# Patient Record
Sex: FEMALE | Race: WHITE | NOT HISPANIC OR LATINO | Employment: FULL TIME | URBAN - METROPOLITAN AREA
[De-identification: names, ages, dates, MRNs, and addresses within clinical notes are randomized per-mention and may not be internally consistent; named-entity substitution may affect disease eponyms.]

---

## 2017-07-25 ENCOUNTER — TREATMENT (OUTPATIENT)
Dept: PHYSICAL THERAPY | Facility: CLINIC | Age: 32
End: 2017-07-25

## 2017-07-25 DIAGNOSIS — Z02.1 DRUG SCREENING, PRE-EMPLOYMENT: Primary | ICD-10-CM

## 2017-07-25 PROCEDURE — PDS: Performed by: PHYSICAL THERAPIST

## 2018-05-23 ENCOUNTER — OFFICE VISIT CONVERTED (OUTPATIENT)
Dept: FAMILY MEDICINE CLINIC | Age: 33
End: 2018-05-23
Attending: NURSE PRACTITIONER

## 2019-02-27 ENCOUNTER — HOSPITAL ENCOUNTER (OUTPATIENT)
Dept: OTHER | Facility: HOSPITAL | Age: 34
Discharge: HOME | End: 2019-02-27
Attending: NURSE PRACTITIONER

## 2019-02-27 ENCOUNTER — OFFICE VISIT CONVERTED (OUTPATIENT)
Dept: FAMILY MEDICINE CLINIC | Age: 34
End: 2019-02-27
Attending: NURSE PRACTITIONER

## 2019-02-27 LAB
ALBUMIN SERPL-MCNC: 4.1 G/DL (ref 3.5–5)
ALBUMIN/GLOB SERPL: 1.2 {RATIO} (ref 1.4–2.6)
ALP SERPL-CCNC: 92 U/L (ref 42–98)
ALT SERPL-CCNC: 17 U/L (ref 10–40)
ANION GAP SERPL CALC-SCNC: 15 MMOL/L (ref 8–19)
AST SERPL-CCNC: 21 U/L (ref 15–50)
BILIRUB SERPL-MCNC: 0.3 MG/DL (ref 0.2–1.3)
BUN SERPL-MCNC: 7 MG/DL (ref 5–25)
BUN/CREAT SERPL: 10 {RATIO} (ref 6–20)
CALCIUM SERPL-MCNC: 9.1 MG/DL (ref 8.7–10.4)
CHLORIDE SERPL-SCNC: 103 MMOL/L (ref 99–111)
CHOLEST SERPL-MCNC: 191 MG/DL (ref 107–200)
CHOLEST/HDLC SERPL: 4 {RATIO} (ref 3–6)
CONV CO2: 25 MMOL/L (ref 22–32)
CONV TOTAL PROTEIN: 7.6 G/DL (ref 6.3–8.2)
CREAT UR-MCNC: 0.69 MG/DL (ref 0.5–0.9)
GFR SERPLBLD BASED ON 1.73 SQ M-ARVRAT: >60 ML/MIN/{1.73_M2}
GLOBULIN UR ELPH-MCNC: 3.5 G/DL (ref 2–3.5)
GLUCOSE SERPL-MCNC: 82 MG/DL (ref 65–99)
HDLC SERPL-MCNC: 48 MG/DL (ref 40–60)
LDLC SERPL CALC-MCNC: 126 MG/DL (ref 70–100)
OSMOLALITY SERPL CALC.SUM OF ELEC: 283 MOSM/KG (ref 273–304)
POTASSIUM SERPL-SCNC: 4.6 MMOL/L (ref 3.5–5.3)
SODIUM SERPL-SCNC: 138 MMOL/L (ref 135–147)
TRIGL SERPL-MCNC: 86 MG/DL (ref 40–150)
TSH SERPL-ACNC: 1.7 M[IU]/L (ref 0.27–4.2)
VLDLC SERPL-MCNC: 17 MG/DL (ref 5–37)

## 2020-02-26 ENCOUNTER — OFFICE VISIT CONVERTED (OUTPATIENT)
Dept: FAMILY MEDICINE CLINIC | Age: 35
End: 2020-02-26
Attending: NURSE PRACTITIONER

## 2021-05-18 NOTE — PROGRESS NOTES
Keeley Duong 1985     Office/Outpatient Visit    Visit Date: Wed, Feb 27, 2019 10:41 am    Provider: Hailee Wright N.P. (Assistant: Kiki Khan LPN)    Location: [facility]        Electronically signed by Hailee Wright N.P. on  02/27/2019 08:22:01 PM                             SUBJECTIVE:        CC:     Valerie is a 34 year old White female.  Preventative exam;         HPI:         Patient to be evaluated for health checkup.  She cannot recall when she last had a physical exam.  Her last menstrual period was last week.  She is not currently using any form of contraception.  She performs breast self-exams monthly.    She has never had a Pap smear. She's had vision screening done 10 years ago and this was normal.   Preventative Health updated today.  She is not current with her Td and influenza immunization.  Tobacco: Current Smoker: Currently smokes cigarettes every day.          PHQ-9 Depression Screening: Completed form scanned and in chart; Total Score 5 Alcohol Consumption Screening: Completed form scanned and in chart; Total Score 0     ROS:     CONSTITUTIONAL:  Positive for fatigue ( mild ).   Negative for chills or fever.      EYES:  Positive for decreased vision left eye chronic.      E/N/T:  Negative for hearing problems, E/N/T pain, congestion, rhinorrhea, epistaxis, hoarseness, and dental problems.      CARDIOVASCULAR:  Negative for chest pain, palpitations, tachycardia, orthopnea, and edema.      RESPIRATORY:  Negative for cough, dyspnea, and hemoptysis.      GASTROINTESTINAL:  Negative for abdominal pain, heartburn, constipation, diarrhea, and stool changes.      GENITOURINARY:  Negative for genital lesions, hematuria, menstrual problems, polyuria, abnormal vaginal bleeding, and vaginal discharge.      MUSCULOSKELETAL:  Negative for arthralgias, back pain, and myalgias.      INTEGUMENTARY/BREAST:  Negative for rash.      NEUROLOGICAL:  Negative for dizziness, headaches,  paresthesias, and weakness.      ENDOCRINE:  Positive for hair loss.      ALLERGIC/IMMUNOLOGIC:  Negative for allergies, frequent illnesses, HIV exposure, and urticaria.      PSYCHIATRIC:  Positive for insomnia; related to third shigt.          PMH/FMH/SH:     Last Reviewed on 2019 11:14 AM by Hailee Wright    Past Medical History:         Fracture(s): left wrist and right small finger;     has been told she has overactive thyroid, has not been treated         GYNECOLOGICAL HISTORY:    G0    Not currently using any form of contraception.          Surgical History:         ganglion cyst removal left wrist ;         Family History:     Father: Hypertension; sleep apnea;  Hypothyroidism     Mother: sleep apnea;  Seizure Disorder factor 5 deficiency     Paternal Grandfather: Myocardial Infarction (  );  Parkinson's Disease     Maternal Grandfather: ;  Myocardial Infarction     Maternal Grandmother: Breast Cancer         Social History:     Occupation: [employer]     Marital Status:      Children: None         Tobacco/Alcohol/Supplements:     Last Reviewed on 2019 10:45 AM by Kiki Khan    Tobacco: Current Smoker: She currently smokes every day, 1/2 pack per day.          Alcohol: Frequency:    rarely;     Caffeine:  She admits to consuming caffeine via soda ( 6 servings per day ).          Substance Abuse History:     Last Reviewed on 2017 04:54 PM by Rachael Eisenberg        Mental Health History:     Last Reviewed on 2017 04:54 PM by Rachael Eisenberg        Communicable Diseases (eg STDs):     Last Reviewed on 2017 04:54 PM by Rachael Eisenberg            Current Problems:     Last Reviewed on 2019 08:17 PM by Hailee Wright    Family history of other endocrine and metabolic diseases     Screening for depression         Immunizations:     None        Allergies:     Last Reviewed on 2019 10:45 AM by Kiki Khan      No Known Drug Allergies.          Current Medications:     Last Reviewed on 2/27/2019 10:45 AM by Kiki Khan    Ibuprofen 200mg Capsules 1-2 PRN     Mucinex         OBJECTIVE:        Vitals:         Historical:     05/23/2018  BP:   107/69 mm Hg ( (left arm, , sitting, );)     05/23/2018  Wt:   198lbs        Current: 2/27/2019 10:48:03 AM    Ht:  5 ft, 5 in;  Wt: 198.6 lbs;  WC: 39.5 inches;  BMI: 33.0    T: 98.4 F (oral);  BP: 106/58 mm Hg (left arm, sitting);  P: 66 bpm (left arm (BP Cuff), sitting);  sCr: 0.69 mg/dL;  GFR: 130.26        Exams:     PHYSICAL EXAM:     GENERAL:  well developed and nourished; appropriately groomed; in no apparent distress;     EYES: PERRL, EOMI     E/N/T: EARS: bilateral TMs are normal;  NOSE: normal nasal mucosa; OROPHARYNX: posterior pharynx, including tonsils, tongue, and uvula are normal;     NECK: thyroid is non-palpable;     RESPIRATORY: normal respiratory rate and pattern with no distress; normal breath sounds with no rales, rhonchi, wheezes or rubs;     CARDIOVASCULAR: normal rate; rhythm is regular;  no edema;     GASTROINTESTINAL: nontender; normal bowel sounds;     LYMPHATIC: no enlargement of cervical or facial nodes;     MUSCULOSKELETAL:  Normal range of motion, strength and tone;     NEUROLOGIC: mental status: alert and oriented x 3; GROSSLY INTACT     PSYCHIATRIC:  appropriate affect and demeanor; normal speech pattern; grossly normal memory;         Procedures:     Tdap vaccination     1. Tdap: 0.5 ml unit dose given IM in the right upper arm; administered by mnp;  lot number [id number]; expires 5/3/20             ASSESSMENT           V70.0   Z00.00  Health checkup              DDx:     V79.0   Z13.89  Screening for depression              DDx:     V06.1   Z23  Tdap vaccination              DDx:         ORDERS:         Lab Orders:       [id number]  COMP - [facility] Comp. Metabolic Panel  (Send-Out)         95642  LPDP - [facility] Lipid Panel  (Send-Out)         28926  TSH - [facility] TSH  (Send-Out)            Procedures Ordered:       01037  Tdap vaccine, when administered to individuals 7 years or older, for intramuscular use  (In-House)         66409  Immunization administration; one vaccine  (In-House)           Other Orders:         Depression screen negative  (In-House)                   PLAN:          Health checkup     LABORATORY:  Labs ordered to be performed today include Comprehensive metabolic panel, lipid panel, and TSH.      COUNSELING was provided today regarding the following topics: healthy eating habits, low cholesterol diet, low salt diet, regular exercise, breast self-exam, contraception, and STD prevention.            Orders:       [id number]  COMP - [facility] Comp. Metabolic Panel  (Send-Out)         30875  LPDP - [facility] Lipid Panel  (Send-Out)         26991  TSH - [facility] TSH  (Send-Out)             Patient Education Handouts:       Physical Exam 30-39 year, Female           Screening for depression     MIPS PHQ-9 Depression Screening Completed form scanned and in chart; Total Score 5           Orders:         Depression screen negative  (In-House)            Tdap vaccination           Orders:       75580  Tdap vaccine, when administered to individuals 7 years or older, for intramuscular use  (In-House)         66817  Immunization administration; one vaccine  (In-House)               Patient Recommendations:        For  Health checkup:         Limit dietary intake of fat (especially saturated fat) and cholesterol.  Eat a variety of foods, including plenty of fruits, vegetables, and grain containg fiber, limit fat intake to 30% of total calories. Balance caloric intake with energy expended.    Maintaining regular physical activity is advised to help prevent heart disease, hypertension, diabetes, and obesity.    You should regularly examine your breasts, easily done while in the shower or with lotion.  Feel and look for differences in consistency from month to month, especially noting knots or lumps, changes  in skin appearance, nipple retraction or discharge.    Sexually transmitted diseases may be prevented by abstaining from sexual activity or avoiding sexual contact with high risk partners, and consistently using a condom or female barrier contraceptives plus spermacide.              CHARGE CAPTURE           **Please note: ICD descriptions below are intended for billing purposes only and may not represent clinical diagnoses**        Primary Diagnosis:         V70.0 Health checkup            Z00.00    Encounter for general adult medical examination without abnormal findings              Orders:          15139   Preventive medicine, established patient, age 18-39 years  (In-House)           V79.0 Screening for depression            Z13.89    Encounter for screening for other disorder              Orders:             Depression screen negative  (In-House)           V06.1 Tdap vaccination            Z23    Encounter for immunization              Orders:          26981   Tdap vaccine, when administered to individuals 7 years or older, for intramuscular use  (In-House)             53448   Immunization administration; one vaccine  (In-House)

## 2021-05-18 NOTE — PROGRESS NOTES
Keeley Duong 1985     Office/Outpatient Visit    Visit Date: Wed, May 23, 2018 12:17 pm    Provider: Lola Simental N.P. (Assistant: Josey Dash MA)    Location: [facility]        Electronically signed by Lola Simental N.P. on  2018 07:50:33 PM                             SUBJECTIVE:        CC:     Ms. Duong is a 33 year old White female.  Left eye vision problems;         HPI:     Valerie reports that while she was at work last night, she had episode of problems with her left eye. She had blurred vision and what she describes as a whiteness. It was like staring into a bright light. This lasted about an hour. BP was elevated at the time. Heart rate 70s. No headache, dizziness, or nausea at the time. She notes that she did develop headache after she was at home. Symptoms have since resolved. She notes that her mother has Factor 5 Leiden and polycythemia vera.     ROS:     CONSTITUTIONAL:  Negative for chills and fever.      EYES:  Positive for blurred vision ( left eye; resolved ) and photophobia ( left eye resolved ).   Negative for eye drainage or eye pain.      E/N/T:  Negative for ear pain and sore throat.      CARDIOVASCULAR:  Negative for chest pain and palpitations.      RESPIRATORY:  Negative for dyspnea and frequent wheezing.      NEUROLOGICAL:  Positive for headaches.   Negative for dizziness.      PSYCHIATRIC:  Negative for depression and suicidal thoughts.          PMH/FMH/SH:     Last Reviewed on 2017 04:54 PM by Rachael Eisenberg    Past Medical History:         Fracture(s): left wrist and right small finger;     has been told she has overactive thyroid, has not been treated         GYNECOLOGICAL HISTORY:    G0    Not currently using any form of contraception.          Surgical History:     NONE         Family History:     Father: Hypertension; sleep apnea     Mother: sleep apnea;  Seizure Disorder     Paternal Grandfather: Myocardial Infarction (  );   Parkinson's Disease     Maternal Grandfather: ;  Myocardial Infarction     Maternal Grandmother: Breast Cancer         Social History:     Occupation: [employer]     Marital Status: Single         Tobacco/Alcohol/Supplements:     Last Reviewed on 2017 04:54 PM by Rachael Eisenberg    Tobacco: Current Smoker: She currently smokes every day, 1/2 pack per day.          Alcohol: Frequency:    rarely;     Caffeine:  She admits to consuming caffeine via soda ( 6 servings per day ).          Substance Abuse History:     Last Reviewed on 2017 04:54 PM by Rachael Eisenberg        Mental Health History:     Last Reviewed on 2017 04:54 PM by Rachael Eisenberg        Communicable Diseases (eg STDs):     Last Reviewed on 2017 04:54 PM by Rachael Eisneberg            Current Problems:     Last Reviewed on 2017 04:54 PM by Rachael Eisenberg    Back pain     Family history of other endocrine and metabolic diseases         Immunizations:     None        Allergies:     Last Reviewed on 2017 04:54 PM by Rachael Eisenberg      No Known Drug Allergies.         Current Medications:     Last Reviewed on 2017 04:54 PM by Rachael Eisenberg    Ibuprofen 200mg Capsules 1-2 PRN     Mucinex         OBJECTIVE:        Vitals:         Current: 2018 12:24:26 PM    Ht:  5 ft, 5 in;  Wt: 198 lbs;  BMI: 32.9    T: 99.2 F (oral);  BP: 107/69 mm Hg (left arm, sitting);  P: 92 bpm (left arm (BP Cuff), sitting);  sCr: 0.82 mg/dL;  GFR: 110.47        Exams:     PHYSICAL EXAM:     GENERAL: well developed, well nourished;  no apparent distress;     EYES: PERRL, EOMI     E/N/T: EARS: external auditory canal normal;  bilateral TMs are normal;  NOSE: normal turbinates; no sinus tenderness; OROPHARYNX: oral mucosa is normal; posterior pharynx shows no exudate;     RESPIRATORY: normal respiratory rate and pattern with no distress; normal breath sounds with no rales, rhonchi, wheezes or rubs;      CARDIOVASCULAR: normal rate; rhythm is regular;     MUSCULOSKELETAL: normal gait;     NEUROLOGIC: mental status: alert and oriented x 3; normal coordination and cerebellar function; GROSSLY INTACT     PSYCHIATRIC: appropriate affect and demeanor;         Lab/Test Results:     AUDIO AND VISUAL SCREENING     Vision Testing: Far Right 20/30 Left 20/20 Bilateral 20/13;         ASSESSMENT           368.8   H53.8  Blurred vision              DDx:         ORDERS:         Lab Orders:       [id number]  [address] CBC w/o diff  (Send-Out)         99367  [facility] Comp. Metabolic Panel  (Send-Out)         48170  [facility] TSH  (Send-Out)           Procedures Ordered:       87943  Screening test of visual acuity, quantitative, bilateral  (In-House)                   PLAN:          Blurred vision No concerning findings on exam today. Discussed with patient possible ocular migraine. Will check labs. Advised to see optometrist for thorough eye exam. Seek ER care immediately for worsening symptoms such as slurred speech, one sided weakness, severe headache.     LABORATORY:  Labs ordered to be performed today include CBC W/O DIFF, Comprehensive metabolic panel, and TSH.      FOLLOW-UP: Schedule follow-up appointments on a p.r.n. basis. for after labs           Orders:       82435  Screening test of visual acuity, quantitative, bilateral  (In-House)         64941  [address] CBC w/o diff  (Send-Out)         62915  [facility] Comp. Metabolic Panel  (Send-Out)         84732  TSH [facility] TSH  (Send-Out)               Patient Recommendations:        For  Blurred vision:     Schedule follow-up appointments as needed.              CHARGE CAPTURE           **Please note: ICD descriptions below are intended for billing purposes only and may not represent clinical diagnoses**        Primary Diagnosis:         368.8 Blurred vision            H53.8    Other visual disturbances              Orders:          46570   Office/outpatient visit;  established patient, level 3  (In-House)             85390   Screening test of visual acuity, quantitative, bilateral  (In-House)

## 2021-05-18 NOTE — PROGRESS NOTES
Keeley Duong  1985     Office/Outpatient Visit    Visit Date: Wed, Feb 26, 2020 11:19 am    Provider: Hailee Wright N.P. (Assistant: Spurling, Sarah C, MA)    Location: [facility]        Electronically signed by Hailee Wright N.P. on  02/27/2020 12:56:58 PM                             Subjective:        CC: Valerie is a 35 year old White female.  PREVENATIVE EXAM.;         HPI:           Patient complains of encounter for general adult medical examination without abnormal findings.  Her last physical exam was 1 year ago.  Her last menstrual period was current.  She is not currently using any form of contraception.  She performs breast self-exams monthly.   She has never had a Pap smear. She has never had a mammogram. She has never had formal vision screening. A hearing test was done <1 year ago and results were normal.   Preventative Health updated today.  She is current with her Td.  She is not current with influenza immunization.            PHQ-9 Depression Screening: Completed form scanned and in chart; Total Score 7     ROS:     CONSTITUTIONAL:  Negative for chills, fatigue, fever, and weight change.      E/N/T:  Negative for hearing problems, E/N/T pain, congestion, rhinorrhea, epistaxis, hoarseness, and dental problems.      CARDIOVASCULAR:  Negative for chest pain, palpitations, tachycardia, orthopnea, and edema.      RESPIRATORY:  Negative for cough, dyspnea, and hemoptysis.      GASTROINTESTINAL:  Negative for abdominal pain, heartburn, constipation, diarrhea, and stool changes.      GENITOURINARY:  Negative for genital lesions, hematuria, menstrual problems, polyuria, abnormal vaginal bleeding, and vaginal discharge.      MUSCULOSKELETAL:  Negative for arthralgias, back pain, and myalgias.      NEUROLOGICAL:  Negative for dizziness, headaches, paresthesias, and weakness.      ENDOCRINE:  Negative for hair loss, heat/cold intolerance, polydipsia, and polyphagia.      PSYCHIATRIC:   Negative for anxiety, depression, and sleep disturbances.          Past Medical History / Family History / Social History:         Last Reviewed on 2020 11:44 AM by Hailee Wright    Past Medical History:         Fracture(s): left wrist and right small finger;     has been told she has overactive thyroid, has not been treated         GYNECOLOGICAL HISTORY:    G0    Not currently using any form of contraception.          Surgical History:         ganglion cyst removal left wrist ;         Family History:     Father: Hypertension; sleep apnea;  Hypothyroidism     Mother: sleep apnea;  Seizure Disorder factor 5 deficiency     Paternal Grandfather: Myocardial Infarction (  );  Parkinson's Disease     Maternal Grandfather: ;  Myocardial Infarction     Maternal Grandmother: Breast Cancer         Social History:     Occupation: [employer]     Marital Status:      Children: None         Tobacco/Alcohol/Supplements:     Last Reviewed on 2020 11:31 AM by Spurling, Sarah C    Tobacco: Current Smoker: She currently smokes every day, 1/2 pack per day.          Alcohol: Frequency:    rarely;     Caffeine:  She admits to consuming caffeine via soda ( 6 servings per day ).          Substance Abuse History:     Last Reviewed on 2017 04:54 PM by Rachael Eisenberg        Mental Health History:     Last Reviewed on 2017 04:54 PM by Rachael Eisenberg        Communicable Diseases (eg STDs):     Last Reviewed on 2017 04:54 PM by Rachael Eisenberg        Current Problems:     Last Reviewed on 2020 11:19 AM by Spurling, Sarah C    Encounter for general adult medical examination without abnormal findings    Encounter for screening for depression        Immunizations:     Tdap (Tetanus, reduced diph, acellular pertussis) 2019        Allergies:     Last Reviewed on 2019 10:45 AM by Kiki Khan    No Known Allergies.        Current Medications:     Last Reviewed on 2020  11:27 AM by Spurling, Sarah C    No Known Medications.        Objective:        Vitals:         Historical:     2/27/2019  BP:   106/58 mm Hg ( (left arm, , sitting, );) 2/27/2019  Wt:   198.6lbs    Current: 2/26/2020 11:31:25 AM    Ht:  5 ft, 5 in;  Wt: 210.4 lbs;  BMI: 35.0T: 98.5 F (oral);  BP: 113/70 mm Hg (left arm, sitting);  P: 88 bpm (left arm (BP Cuff), sitting);  sCr: 0.69 mg/dL;  GFR: 132.27        Exams:     PHYSICAL EXAM:     GENERAL: no apparent distress;     EYES: PERRL, EOMI     E/N/T: EARS: bilateral TMs are normal;  NOSE: normal nasal mucosa; OROPHARYNX: posterior pharynx, including tonsils, tongue, and uvula are normal;     NECK: thyroid is non-palpable;     RESPIRATORY: normal respiratory rate and pattern with no distress; normal breath sounds with no rales, rhonchi, wheezes or rubs;     CARDIOVASCULAR: normal rate; rhythm is regular;  no edema;     GASTROINTESTINAL: nontender; normal bowel sounds;     LYMPHATIC: no enlargement of cervical or facial nodes;     MUSCULOSKELETAL:  Normal range of motion, strength and tone;     NEUROLOGIC: mental status: alert and oriented x 3; GROSSLY INTACT     PSYCHIATRIC:  appropriate affect and demeanor; normal speech pattern; grossly normal memory;         Lab/Test Results:         Total Cholesterol: 174 (02/26/2020),     HDL: 43 (02/26/2020),     Triglycerides: 98 (02/26/2020),     LDL: 112 (02/26/2020),     non-HDL: 131 (02/26/2020),     LDL/HDL Ratio: 2.6 (02/26/2020),     Glucose capillary: 97 (02/26/2020),     Performed by:: ellie (02/26/2020),             Assessment:         Z00.00   Encounter for general adult medical examination without abnormal findings       Z13.31   Encounter for screening for depression           ORDERS:         Lab Orders:       [id number]*  Lipid panel inhouse  (In-House)            78897*  Glucose quantitative inhouse  (In-House)              Procedures Ordered:       [id number]  Collection of capillary blood specimen (eg, finger, heel, ear  stick)  (In-House)              Other Orders:         Depression screen negative  (In-House)                      Plan:         Encounter for general adult medical examination without abnormal findings        COUNSELING was provided today regarding the following topics: healthy eating habits, low cholesterol diet, low salt diet, regular exercise, breast self-exam, contraception, and STD prevention.          advise she have well woman exam with pap smear           Orders:       85885  Collection of capillary blood specimen (eg, finger, heel, ear stick)  (In-House)            49966*  Lipid panel inhouse  (In-House)            19904*  Glucose quantitative inhouse  (In-House)                Patient Education Handouts:       Physical Exam 30-39 year, Female          Encounter for screening for depression    MIPS PHQ-9 Depression Screening: Completed form scanned and in chart; Total Score 7; Positive Depression Screen but after further evaluation the patient does not have a diagnosis of depression.            Orders:         Depression screen negative  (In-House)                  Patient Recommendations:        For  Encounter for general adult medical examination without abnormal findings:        Limit dietary intake of fat (especially saturated fat) and cholesterol.  Eat a variety of foods, including plenty of fruits, vegetables, and grain containg fiber, limit fat intake to 30% of total calories. Balance caloric intake with energy expended.    Maintaining regular physical activity is advised to help prevent heart disease, hypertension, diabetes, and obesity.    You should regularly examine your breasts, easily done while in the shower or with lotion.  Feel and look for differences in consistency from month to month, especially noting knots or lumps, changes in skin appearance, nipple retraction or discharge.    Sexually transmitted diseases may be prevented by abstaining from sexual activity or avoiding sexual  contact with high risk partners, and consistently using a condom or female barrier contraceptives plus spermacide.              Charge Capture:         Primary Diagnosis:     Z00.00  Encounter for general adult medical examination without abnormal findings           Orders:      93688  Preventive medicine, established patient, age 18-39 years  (In-House)            57670  Collection of capillary blood specimen (eg, finger, heel, ear stick)  (In-House)            41271*  Lipid panel inhouse  (In-House)            11938*  Glucose quantitative inhouse  (In-House)              Z13.31  Encounter for screening for depression           Orders:        Depression screen negative  (In-House)

## 2021-07-01 VITALS
BODY MASS INDEX: 32.99 KG/M2 | DIASTOLIC BLOOD PRESSURE: 69 MMHG | HEART RATE: 92 BPM | WEIGHT: 198 LBS | TEMPERATURE: 99.2 F | HEIGHT: 65 IN | SYSTOLIC BLOOD PRESSURE: 107 MMHG

## 2021-07-01 VITALS
TEMPERATURE: 98.4 F | DIASTOLIC BLOOD PRESSURE: 58 MMHG | HEART RATE: 66 BPM | WEIGHT: 198.6 LBS | SYSTOLIC BLOOD PRESSURE: 106 MMHG | BODY MASS INDEX: 33.09 KG/M2 | HEIGHT: 65 IN

## 2021-07-02 VITALS
SYSTOLIC BLOOD PRESSURE: 113 MMHG | BODY MASS INDEX: 35.06 KG/M2 | TEMPERATURE: 98.5 F | HEIGHT: 65 IN | WEIGHT: 210.4 LBS | DIASTOLIC BLOOD PRESSURE: 70 MMHG | HEART RATE: 88 BPM

## 2024-06-04 ENCOUNTER — OFFICE VISIT (OUTPATIENT)
Dept: FAMILY MEDICINE CLINIC | Age: 39
End: 2024-06-04
Payer: COMMERCIAL

## 2024-06-04 VITALS
BODY MASS INDEX: 32.86 KG/M2 | HEART RATE: 89 BPM | TEMPERATURE: 98.1 F | DIASTOLIC BLOOD PRESSURE: 66 MMHG | WEIGHT: 197.2 LBS | SYSTOLIC BLOOD PRESSURE: 116 MMHG | HEIGHT: 65 IN

## 2024-06-04 DIAGNOSIS — Z76.89 ENCOUNTER TO ESTABLISH CARE: Primary | ICD-10-CM

## 2024-06-04 DIAGNOSIS — Z13.1 SCREENING FOR DIABETES MELLITUS (DM): ICD-10-CM

## 2024-06-04 DIAGNOSIS — Z11.59 NEED FOR HEPATITIS C SCREENING TEST: ICD-10-CM

## 2024-06-04 DIAGNOSIS — Z13.29 SCREENING FOR THYROID DISORDER: ICD-10-CM

## 2024-06-04 DIAGNOSIS — Z13.220 SCREENING FOR CHOLESTEROL LEVEL: ICD-10-CM

## 2024-06-04 DIAGNOSIS — Z00.00 PREVENTATIVE HEALTH CARE: ICD-10-CM

## 2024-06-04 DIAGNOSIS — H93.8X1 EAR FULLNESS, RIGHT: ICD-10-CM

## 2024-06-04 PROCEDURE — 99203 OFFICE O/P NEW LOW 30 MIN: CPT | Performed by: NURSE PRACTITIONER

## 2024-06-04 RX ORDER — FLUTICASONE PROPIONATE 50 MCG
2 SPRAY, SUSPENSION (ML) NASAL DAILY
Qty: 16 G | Refills: 0 | Status: SHIPPED | OUTPATIENT
Start: 2024-06-04

## 2024-06-04 RX ORDER — METHYLPREDNISOLONE 4 MG/1
TABLET ORAL ONCE
COMMUNITY
Start: 2024-05-29 | End: 2024-06-04

## 2024-06-04 RX ORDER — AMOXICILLIN 500 MG/1
1000 CAPSULE ORAL 3 TIMES DAILY
COMMUNITY
Start: 2024-05-29

## 2024-06-04 NOTE — PROGRESS NOTES
"Chief Complaint  Establish Care, Ear Fullness (Pt states that she had a bilateral earache about a week and a half ago, ears still feel full but her ears have felt full for years./), and Lab Work    Subjective          Keeley Duong presents to [facility]  History of Present Illness  She is here today to establish care.    She had right ear pain recently. She went to  and was prescribed amoxicillin and Medrol Dosepack. The ear pain has resolved, but she is still having fullness in her right ear. She reports that they have filled full for 2-3 years. She doesn't have issues with allergies. She denies URI symptoms.       Objective   Vital Signs:   /66 (BP Location: Left arm, Patient Position: Sitting)   Pulse 89   Temp 98.1 °F (36.7 °C) (Oral)   Ht 165.1 cm (65\")   Wt 89.4 kg (197 lb 3.2 oz)   BMI 32.82 kg/m²     Physical Exam  Constitutional:       General: She is not in acute distress.     Appearance: Normal appearance.   HENT:      Head: Normocephalic.      Right Ear: Tympanic membrane, ear canal and external ear normal.      Left Ear: Tympanic membrane, ear canal and external ear normal.   Eyes:      Pupils: Pupils are equal, round, and reactive to light.      Visual Fields: Right eye visual fields normal and left eye visual fields normal.   Neck:      Trachea: Trachea normal.   Cardiovascular:      Rate and Rhythm: Normal rate and regular rhythm.      Heart sounds: Normal heart sounds.   Pulmonary:      Effort: Pulmonary effort is normal.      Breath sounds: Normal breath sounds and air entry.   Musculoskeletal:      Right lower leg: No edema.      Left lower leg: No edema.   Skin:     General: Skin is warm and dry.   Neurological:      Mental Status: She is alert and oriented to person, place, and time.   Psychiatric:         Mood and Affect: Mood and affect normal.         Behavior: Behavior normal.         Thought Content: Thought content normal.        Result " Review :   The following data was reviewed by: NICK Glover on 06/04/2024:                  Assessment and Plan    Diagnoses and all orders for this visit:    1. Encounter to establish care (Primary)    2. Ear fullness, right  Comments:  Nothing concerning seen upon examination.  Will give a trial of Flonase and see if that will help  Orders:  -     fluticasone (FLONASE) 50 MCG/ACT nasal spray; 2 sprays into the nostril(s) as directed by provider Daily.  Dispense: 16 g; Refill: 0    3. Preventative health care  -     Comprehensive Metabolic Panel; Future  -     Lipid Panel; Future  -     Hemoglobin A1c; Future  -     CBC w AUTO Differential; Future  -     TSH; Future  -     Hepatitis C Antibody; Future    4. Screening for cholesterol level  -     Lipid Panel; Future    5. Screening for thyroid disorder  -     TSH; Future    6. Screening for diabetes mellitus (DM)  -     Hemoglobin A1c; Future    7. Need for hepatitis C screening test  -     Hepatitis C Antibody; Future          Follow Up   Return if symptoms worsen or fail to improve.  Patient was given instructions and counseling regarding her condition or for health maintenance advice. Please see specific information pulled into the AVS if appropriate.

## 2024-07-09 ENCOUNTER — TELEPHONE (OUTPATIENT)
Dept: FAMILY MEDICINE CLINIC | Age: 39
End: 2024-07-09
Payer: COMMERCIAL

## 2024-07-11 ENCOUNTER — LAB (OUTPATIENT)
Dept: LAB | Facility: HOSPITAL | Age: 39
End: 2024-07-11
Payer: COMMERCIAL

## 2024-07-11 DIAGNOSIS — Z13.1 SCREENING FOR DIABETES MELLITUS (DM): ICD-10-CM

## 2024-07-11 DIAGNOSIS — Z00.00 PREVENTATIVE HEALTH CARE: ICD-10-CM

## 2024-07-11 DIAGNOSIS — Z13.29 SCREENING FOR THYROID DISORDER: ICD-10-CM

## 2024-07-11 DIAGNOSIS — Z11.59 NEED FOR HEPATITIS C SCREENING TEST: ICD-10-CM

## 2024-07-11 DIAGNOSIS — Z13.220 SCREENING FOR CHOLESTEROL LEVEL: ICD-10-CM

## 2024-07-11 LAB
ALBUMIN SERPL-MCNC: 4.2 G/DL (ref 3.5–5.2)
ALBUMIN/GLOB SERPL: 1.4 G/DL
ALP SERPL-CCNC: 75 U/L (ref 39–117)
ALT SERPL W P-5'-P-CCNC: 11 U/L (ref 1–33)
ANION GAP SERPL CALCULATED.3IONS-SCNC: 11 MMOL/L (ref 5–15)
AST SERPL-CCNC: 20 U/L (ref 1–32)
BASOPHILS # BLD AUTO: 0.06 10*3/MM3 (ref 0–0.2)
BASOPHILS NFR BLD AUTO: 0.7 % (ref 0–1.5)
BILIRUB SERPL-MCNC: 0.2 MG/DL (ref 0–1.2)
BUN SERPL-MCNC: 7 MG/DL (ref 6–20)
BUN/CREAT SERPL: 10.4 (ref 7–25)
CALCIUM SPEC-SCNC: 9.3 MG/DL (ref 8.6–10.5)
CHLORIDE SERPL-SCNC: 103 MMOL/L (ref 98–107)
CHOLEST SERPL-MCNC: 189 MG/DL (ref 0–200)
CO2 SERPL-SCNC: 23 MMOL/L (ref 22–29)
CREAT SERPL-MCNC: 0.67 MG/DL (ref 0.57–1)
DEPRECATED RDW RBC AUTO: 53.2 FL (ref 37–54)
EGFRCR SERPLBLD CKD-EPI 2021: 114.9 ML/MIN/1.73
EOSINOPHIL # BLD AUTO: 0.15 10*3/MM3 (ref 0–0.4)
EOSINOPHIL NFR BLD AUTO: 1.8 % (ref 0.3–6.2)
ERYTHROCYTE [DISTWIDTH] IN BLOOD BY AUTOMATED COUNT: 18.5 % (ref 12.3–15.4)
GLOBULIN UR ELPH-MCNC: 3.1 GM/DL
GLUCOSE SERPL-MCNC: 78 MG/DL (ref 65–99)
HBA1C MFR BLD: 5.4 % (ref 4.8–5.6)
HCT VFR BLD AUTO: 33.3 % (ref 34–46.6)
HCV AB SER QL: NORMAL
HDLC SERPL-MCNC: 48 MG/DL (ref 40–60)
HGB BLD-MCNC: 10.1 G/DL (ref 12–15.9)
IMM GRANULOCYTES # BLD AUTO: 0.01 10*3/MM3 (ref 0–0.05)
IMM GRANULOCYTES NFR BLD AUTO: 0.1 % (ref 0–0.5)
LDLC SERPL CALC-MCNC: 130 MG/DL (ref 0–100)
LDLC/HDLC SERPL: 2.7 {RATIO}
LYMPHOCYTES # BLD AUTO: 2.73 10*3/MM3 (ref 0.7–3.1)
LYMPHOCYTES NFR BLD AUTO: 32.3 % (ref 19.6–45.3)
MCH RBC QN AUTO: 24 PG (ref 26.6–33)
MCHC RBC AUTO-ENTMCNC: 30.3 G/DL (ref 31.5–35.7)
MCV RBC AUTO: 79.1 FL (ref 79–97)
MONOCYTES # BLD AUTO: 0.65 10*3/MM3 (ref 0.1–0.9)
MONOCYTES NFR BLD AUTO: 7.7 % (ref 5–12)
NEUTROPHILS NFR BLD AUTO: 4.84 10*3/MM3 (ref 1.7–7)
NEUTROPHILS NFR BLD AUTO: 57.4 % (ref 42.7–76)
PLATELET # BLD AUTO: 385 10*3/MM3 (ref 140–450)
PMV BLD AUTO: 10 FL (ref 6–12)
POTASSIUM SERPL-SCNC: 3.8 MMOL/L (ref 3.5–5.2)
PROT SERPL-MCNC: 7.3 G/DL (ref 6–8.5)
RBC # BLD AUTO: 4.21 10*6/MM3 (ref 3.77–5.28)
SODIUM SERPL-SCNC: 137 MMOL/L (ref 136–145)
TRIGL SERPL-MCNC: 58 MG/DL (ref 0–150)
TSH SERPL DL<=0.05 MIU/L-ACNC: 2.23 UIU/ML (ref 0.27–4.2)
VLDLC SERPL-MCNC: 11 MG/DL (ref 5–40)
WBC NRBC COR # BLD AUTO: 8.44 10*3/MM3 (ref 3.4–10.8)

## 2024-07-11 PROCEDURE — 80061 LIPID PANEL: CPT

## 2024-07-11 PROCEDURE — 36415 COLL VENOUS BLD VENIPUNCTURE: CPT

## 2024-07-11 PROCEDURE — 83036 HEMOGLOBIN GLYCOSYLATED A1C: CPT

## 2024-07-11 PROCEDURE — 86803 HEPATITIS C AB TEST: CPT

## 2024-07-11 PROCEDURE — 80050 GENERAL HEALTH PANEL: CPT

## 2024-07-15 DIAGNOSIS — D50.9 MICROCYTIC ANEMIA: Primary | ICD-10-CM

## 2024-07-17 ENCOUNTER — LAB (OUTPATIENT)
Dept: LAB | Facility: HOSPITAL | Age: 39
End: 2024-07-17
Payer: COMMERCIAL

## 2024-07-17 DIAGNOSIS — D50.9 MICROCYTIC ANEMIA: ICD-10-CM

## 2024-07-17 LAB
FERRITIN SERPL-MCNC: 7.01 NG/ML (ref 13–150)
IRON 24H UR-MRATE: 20 MCG/DL (ref 37–145)
IRON SATN MFR SERPL: 5 % (ref 20–50)
TIBC SERPL-MCNC: 422 MCG/DL (ref 298–536)
TRANSFERRIN SERPL-MCNC: 283 MG/DL (ref 200–360)

## 2024-07-17 PROCEDURE — 84466 ASSAY OF TRANSFERRIN: CPT

## 2024-07-17 PROCEDURE — 82728 ASSAY OF FERRITIN: CPT

## 2024-07-17 PROCEDURE — 83540 ASSAY OF IRON: CPT

## 2024-07-17 PROCEDURE — 36415 COLL VENOUS BLD VENIPUNCTURE: CPT

## 2024-07-22 DIAGNOSIS — D50.9 IRON DEFICIENCY ANEMIA, UNSPECIFIED IRON DEFICIENCY ANEMIA TYPE: Primary | ICD-10-CM

## 2024-07-22 RX ORDER — FERROUS SULFATE 325(65) MG
325 TABLET ORAL
Qty: 90 TABLET | Refills: 1 | Status: SHIPPED | OUTPATIENT
Start: 2024-07-22

## 2024-08-23 ENCOUNTER — OFFICE VISIT (OUTPATIENT)
Age: 39
End: 2024-08-23
Payer: COMMERCIAL

## 2024-08-23 VITALS
DIASTOLIC BLOOD PRESSURE: 73 MMHG | OXYGEN SATURATION: 97 % | SYSTOLIC BLOOD PRESSURE: 108 MMHG | BODY MASS INDEX: 30.49 KG/M2 | WEIGHT: 183 LBS | HEIGHT: 65 IN | HEART RATE: 88 BPM

## 2024-08-23 DIAGNOSIS — N92.0 MENORRHAGIA WITH REGULAR CYCLE: ICD-10-CM

## 2024-08-23 DIAGNOSIS — D64.9 LOW HEMOGLOBIN: ICD-10-CM

## 2024-08-23 DIAGNOSIS — D50.9 IRON DEFICIENCY ANEMIA, UNSPECIFIED IRON DEFICIENCY ANEMIA TYPE: Primary | ICD-10-CM

## 2024-08-23 NOTE — PROGRESS NOTES
Chief Complaint     Establish Care (Consult - Iron deficiency anemia, )    History of Present Illness     Keeley Duong is a 39 y.o. female who presents to [facility] on referral from No ref. provider found for a gastroenterology evaluation of Iron deficiency anemia, unspecified iron deficiency anemia type.      Patient explains she was sent here for Anemia. She reports her mother has Factor V Leiden and Polycythemia Vera. She states her period decreased from 7 to 5 days, and sometimes it is light and other times it can be heavy. Denies nausea, vomiting, or heartburn. Denies hematochezia.     Patient reports an average of 1 BM a day. Stool described as brown, formed, and soft. Denies bright red blood or dark stool. Denies straining. Denies abdominal pain. Patient reports seldom use of NSAIDs. Reports only drinking alcohol once a year.     Patient denies family history of colon cancer or of colon polyps.  No previous EGD/Colonoscopy.         History      Past Medical History:   Diagnosis Date    Anemia        Past Surgical History:   Procedure Laterality Date    GANGLION CYST EXCISION Left        Family History   Problem Relation Age of Onset    Liver disease Mother     Polycythemia Mother     Factor V Leiden deficiency Mother     Stroke Father     Cancer Father     Diabetes Father     Hypertension Father     Parkinsonism Maternal Grandmother     Kidney failure Paternal Grandmother     Parkinsonism Paternal Grandfather     Colon cancer Neg Hx         Current Medications        Current Outpatient Medications:     ferrous sulfate 325 (65 FE) MG tablet, Take 1 tablet by mouth Daily With Breakfast., Disp: 90 tablet, Rfl: 1    amoxicillin (AMOXIL) 500 MG capsule, Take 2 capsules by mouth 3 (Three) Times a Day. (Patient not taking: Reported on 8/23/2024), Disp: , Rfl:     fluticasone (FLONASE) 50 MCG/ACT nasal spray, 2 sprays into the nostril(s) as directed by provider Daily. (Patient  "not taking: Reported on 8/23/2024), Disp: 16 g, Rfl: 0    polyethylene glycol (GoLYTELY) 236 g solution, Follow office instructions., Disp: 4000 mL, Rfl: 0     Allergies     No Known Allergies    Social History       Social History     Social History Narrative    , 0 children. [employer]       Immunizations     Immunization:  Immunization History   Administered Date(s) Administered    COVID-19 (MODERNA) 1st,2nd,3rd Dose Monovalent 03/19/2021, 04/16/2021          Objective     Objective     Vital Signs:   /73 (BP Location: Right arm, Patient Position: Sitting, Cuff Size: Adult)   Pulse 88   Ht 165.1 cm (65\")   Wt 83 kg (183 lb)   SpO2 97%   BMI 30.45 kg/m²       Physical Exam  Constitutional:       Appearance: Normal appearance.   HENT:      Head: Normocephalic.      Nose: Nose normal.   Eyes:      Pupils: Pupils are equal, round, and reactive to light.   Cardiovascular:      Rate and Rhythm: Normal rate.   Pulmonary:      Effort: Pulmonary effort is normal.   Abdominal:      General: Bowel sounds are normal.   Neurological:      General: No focal deficit present.      Mental Status: She is alert.   Psychiatric:         Mood and Affect: Mood normal.         Results      Result Review :   The following data was reviewed by: NICK Flower on 08/23/2024:    Lab Results - Last 18 Months   Lab Units 07/11/24  0830   WBC 10*3/mm3 8.44   HEMOGLOBIN g/dL 10.1*   MCV fL 79.1   PLATELETS 10*3/mm3 385         Lab Results - Last 18 Months   Lab Units 07/11/24  0830   BUN mg/dL 7   CREATININE mg/dL 0.67   SODIUM mmol/L 137   POTASSIUM mmol/L 3.8   CHLORIDE mmol/L 103   CO2 mmol/L 23.0   GLUCOSE mg/dL 78      No results for input(s): \"PROTIME\", \"INR\", \"PTT\" in the last 36109 hours.  Lab Results - Last 18 Months   Lab Units 07/11/24  0830   AST (SGOT) U/L 20   ALT (SGPT) U/L 11   ALK PHOS U/L 75   BILIRUBIN mg/dL 0.2   TOTAL PROTEIN g/dL 7.3   ALBUMIN g/dL 4.2      Lab Results - Last 18 " "Months   Lab Units 07/17/24  0822   IRON mcg/dL 20*   TRANSFERRIN mg/dL 283   TIBC mcg/dL 422   FERRITIN ng/mL 7.01*     No results for input(s): \"HAV\", \"HEPAIGM\", \"HEPBIGM\", \"HEPBCAB\", \"HBEAG\", \"HEPCAB\" in the last 47181 hours.    No Images in the past 120 days found..       Assessment and Plan        Assessment and Plan    Diagnoses and all orders for this visit:    1. Iron deficiency anemia, unspecified iron deficiency anemia type (Primary)  -     Case Request; Standing  -     Follow Anesthesia Guidelines / Protocol; Future  -     Obtain Informed Consent; Future  -     polyethylene glycol (GoLYTELY) 236 g solution; Follow office instructions.  Dispense: 4000 mL; Refill: 0  -     Case Request    2. Low hemoglobin    3. Menorrhagia with regular cycle    Other orders  -     Follow Anesthesia Guidelines / Protocol; Standing  -     Verify NPO; Standing  -     Verify Bowel Prep Was Successful; Standing  -     Give Tap Water Enema If Bowel Prep Insufficient; Standing  -     Obtain Informed Consent; Standing  -     POC Urine Pregnancy; Standing        ESOPHAGOGASTRODUODENOSCOPY (N/A), COLONOSCOPY (N/A)      Follow Up        Follow Up   Return for follow up after procedure.    I recommend and EGD and Colonoscopy for Iron deficiency anemia workup. I have recommended that the patient undergo further evaluation with an EGD and Colonoscopy.  I have discussed this procedure in detail with the patient.  I have discussed the risk, benefits and alternatives.  I have discussed the risk of anesthesia, bleeding and perforation.  Patient understands these risks, benefits and alternatives and wishes to proceed.  I will schedule her at her earliest convenience.  Patient agreeable to this plan and will call with any questions or concerns. Follow up after procedure to discuss the findings and treatment plan.       Patient was given instructions and counseling regarding her condition or for health maintenance advice. Please see specific " information pulled into the AVS if appropriate.

## 2024-09-17 ENCOUNTER — ANESTHESIA EVENT (OUTPATIENT)
Dept: GASTROENTEROLOGY | Facility: HOSPITAL | Age: 39
End: 2024-09-17
Payer: COMMERCIAL

## 2024-09-19 ENCOUNTER — HOSPITAL ENCOUNTER (OUTPATIENT)
Facility: HOSPITAL | Age: 39
Setting detail: HOSPITAL OUTPATIENT SURGERY
Discharge: HOME | End: 2024-09-19
Attending: INTERNAL MEDICINE | Admitting: INTERNAL MEDICINE
Payer: COMMERCIAL

## 2024-09-19 ENCOUNTER — ANESTHESIA (OUTPATIENT)
Dept: GASTROENTEROLOGY | Facility: HOSPITAL | Age: 39
End: 2024-09-19
Payer: COMMERCIAL

## 2024-09-19 VITALS
OXYGEN SATURATION: 100 % | DIASTOLIC BLOOD PRESSURE: 78 MMHG | TEMPERATURE: 97.3 F | BODY MASS INDEX: 29.86 KG/M2 | SYSTOLIC BLOOD PRESSURE: 100 MMHG | WEIGHT: 179.45 LBS | RESPIRATION RATE: 18 BRPM | HEART RATE: 66 BPM

## 2024-09-19 DIAGNOSIS — D50.9 IRON DEFICIENCY ANEMIA, UNSPECIFIED IRON DEFICIENCY ANEMIA TYPE: ICD-10-CM

## 2024-09-19 LAB — B-HCG UR QL: NEGATIVE

## 2024-09-19 PROCEDURE — 45385 COLONOSCOPY W/LESION REMOVAL: CPT | Performed by: INTERNAL MEDICINE

## 2024-09-19 PROCEDURE — 81025 URINE PREGNANCY TEST: CPT

## 2024-09-19 PROCEDURE — 88305 TISSUE EXAM BY PATHOLOGIST: CPT | Performed by: INTERNAL MEDICINE

## 2024-09-19 PROCEDURE — 88342 IMHCHEM/IMCYTCHM 1ST ANTB: CPT | Performed by: INTERNAL MEDICINE

## 2024-09-19 PROCEDURE — 25810000003 LACTATED RINGERS PER 1000 ML

## 2024-09-19 PROCEDURE — 43239 EGD BIOPSY SINGLE/MULTIPLE: CPT | Performed by: INTERNAL MEDICINE

## 2024-09-19 PROCEDURE — 25010000002 PROPOFOL 10 MG/ML EMULSION: Performed by: NURSE ANESTHETIST, CERTIFIED REGISTERED

## 2024-09-19 RX ORDER — SODIUM CHLORIDE, SODIUM LACTATE, POTASSIUM CHLORIDE, CALCIUM CHLORIDE 600; 310; 30; 20 MG/100ML; MG/100ML; MG/100ML; MG/100ML
30 INJECTION, SOLUTION INTRAVENOUS CONTINUOUS
Status: DISCONTINUED | OUTPATIENT
Start: 2024-09-19 | End: 2024-09-19 | Stop reason: HOSPADM

## 2024-09-19 RX ORDER — PROPOFOL 10 MG/ML
VIAL (ML) INTRAVENOUS AS NEEDED
Status: DISCONTINUED | OUTPATIENT
Start: 2024-09-19 | End: 2024-09-19 | Stop reason: SURG

## 2024-09-19 RX ORDER — LIDOCAINE HYDROCHLORIDE 20 MG/ML
INJECTION, SOLUTION EPIDURAL; INFILTRATION; INTRACAUDAL; PERINEURAL AS NEEDED
Status: DISCONTINUED | OUTPATIENT
Start: 2024-09-19 | End: 2024-09-19 | Stop reason: SURG

## 2024-09-19 RX ADMIN — PROPOFOL 250 MCG/KG/MIN: 10 INJECTION, EMULSION INTRAVENOUS at 12:34

## 2024-09-19 RX ADMIN — PROPOFOL 50 MG: 10 INJECTION, EMULSION INTRAVENOUS at 12:34

## 2024-09-19 RX ADMIN — SODIUM CHLORIDE, POTASSIUM CHLORIDE, SODIUM LACTATE AND CALCIUM CHLORIDE 30 ML/HR: 600; 310; 30; 20 INJECTION, SOLUTION INTRAVENOUS at 11:15

## 2024-09-19 RX ADMIN — LIDOCAINE HYDROCHLORIDE 40 MG: 20 INJECTION, SOLUTION INTRAVENOUS at 12:34

## 2024-09-23 ENCOUNTER — TELEPHONE (OUTPATIENT)
Dept: GASTROENTEROLOGY | Facility: CLINIC | Age: 39
End: 2024-09-23
Payer: COMMERCIAL

## 2024-09-23 DIAGNOSIS — D50.9 IRON DEFICIENCY ANEMIA, UNSPECIFIED IRON DEFICIENCY ANEMIA TYPE: Primary | ICD-10-CM

## 2024-09-23 LAB
CYTO UR: NORMAL
LAB AP CASE REPORT: NORMAL
LAB AP CLINICAL INFORMATION: NORMAL
LAB AP SPECIAL STAINS: NORMAL
PATH REPORT.FINAL DX SPEC: NORMAL
PATH REPORT.GROSS SPEC: NORMAL

## 2024-09-24 ENCOUNTER — TELEPHONE (OUTPATIENT)
Dept: GASTROENTEROLOGY | Facility: CLINIC | Age: 39
End: 2024-09-24
Payer: COMMERCIAL

## 2024-09-25 ENCOUNTER — TELEPHONE (OUTPATIENT)
Dept: GASTROENTEROLOGY | Facility: CLINIC | Age: 39
End: 2024-09-25
Payer: COMMERCIAL

## 2024-10-01 PROBLEM — Z83.2 FAMILY HISTORY OF POLYCYTHEMIA VERA: Status: ACTIVE | Noted: 2024-10-01

## 2024-10-01 PROBLEM — Z83.2 FAMILY HISTORY OF FACTOR V LEIDEN MUTATION: Status: ACTIVE | Noted: 2024-10-01

## 2024-10-11 ENCOUNTER — OFFICE VISIT (OUTPATIENT)
Age: 39
End: 2024-10-11
Payer: COMMERCIAL

## 2024-10-11 VITALS
BODY MASS INDEX: 30.66 KG/M2 | DIASTOLIC BLOOD PRESSURE: 63 MMHG | OXYGEN SATURATION: 100 % | HEART RATE: 70 BPM | WEIGHT: 184 LBS | HEIGHT: 65 IN | SYSTOLIC BLOOD PRESSURE: 100 MMHG

## 2024-10-11 DIAGNOSIS — D50.9 IRON DEFICIENCY ANEMIA, UNSPECIFIED IRON DEFICIENCY ANEMIA TYPE: Primary | ICD-10-CM

## 2024-10-11 NOTE — PROGRESS NOTES
Chief Complaint     Follow-up (Colon/EGD )    History of Present Illness     Keeley Duong is a 39 y.o. female who presents to [facility] for follow-up after EGD and Colonoscopy.    History of present illness:  Patient establish care on 8/23/2024 for iron deficiency anemia. Patient explains she was sent here for Anemia. She reports her mother has Factor V Leiden and Polycythemia Vera. She states her period decreased from 7 to 5 days, and sometimes it is light and other times it can be heavy. Denies nausea, vomiting, or heartburn. Denies hematochezia.      Patient reports an average of 1 BM a day. Stool described as brown, formed, and soft. Denies bright red blood or dark stool. Denies straining. Denies abdominal pain. Patient reports seldom use of NSAIDs. Reports only drinking alcohol once a year.      Patient denies family history of colon cancer or of colon polyps. No previous EGD/Colonoscopy.  EGD and colonoscopy ordered for iron deficiency anemia workup.    9/19/2024 EGD and Colonoscopy performed by Dr. Doty:  Chronic inactive gastritis. Negative results for H. Pylori, metaplasia, dysplasia and malignancy. Capsule endoscopy in 2 weeks to complete the GI workup. Patient had a hyperplastic polyp on pathology report that was completely removed. It is recommended the patient have a repeat colonoscopy at the age of 46. Capsule endoscopy scheduled for 9/30/2024, however it was canceled due to patient unable to be in office at 8 AM.    10/11/2024 Office visit: Patient explains that she is unable to do capsule endoscopy to complete anemia workup.  Patient denies nausea, vomiting, or hematemesis.  No reports of heartburn, dysphagia, or unintentional weight loss.    Patient describes a normal bowel pattern.  No reports of constipation or diarrhea.  Denies hematochezia or melena.  No reports of abdominal pain     History      Past Medical History:   Diagnosis Date    Anemia     Family  "history of factor V Leiden mutation 10/01/2024     Past Surgical History:   Procedure Laterality Date    COLONOSCOPY      COLONOSCOPY N/A 9/19/2024    Procedure: COLONOSCOPY WITH COLD SNARE POLYPECTOMY;  Surgeon: Blake Doty MD;  Location: [facility] END[facility]COPY;  Service: Gastroenterology;  Laterality: N/A;  COLON POLYP    ENDOSCOPY      ENDOSCOPY N/A 9/19/2024    Procedure: ESOPHAGOGASTRODUODENOSCOPY WITH BIOPSIES;  Surgeon: Blake Doty MD;  Location: [facility] ENDOSCOPY;  Service: Gastroenterology;  Laterality: N/A;  NORMAL    GANGLION CYST EXCISION Left      Family History   Problem Relation Age of Onset    Liver disease Mother     Polycythemia Mother     Factor V Leiden deficiency Mother     Stroke Father     Cancer Father     Diabetes Father     Hypertension Father     Parkinsonism Maternal Grandmother     Kidney failure Paternal Grandmother     Parkinsonism Paternal Grandfather     Colon cancer Neg Hx         Current Medications       Current Outpatient Medications:     ferrous sulfate 325 (65 FE) MG tablet, Take 1 tablet by mouth Daily With Breakfast., Disp: 90 tablet, Rfl: 1    amoxicillin (AMOXIL) 500 MG capsule, Take 2 capsules by mouth 3 (Three) Times a Day. (Patient not taking: Reported on 8/23/2024), Disp: , Rfl:     fluticasone (FLONASE) 50 MCG/ACT nasal spray, 2 sprays into the nostril(s) as directed by provider Daily., Disp: 16 g, Rfl: 0    polyethylene glycol (GoLYTELY) 236 g solution, Follow office instructions., Disp: 4000 mL, Rfl: 0     Allergies     No Known Allergies    Social History       Social History     Social History Narrative    , 0 children. [employer]         Objective       /63 (BP Location: Right arm, Patient Position: Sitting, Cuff Size: Adult)   Pulse 70   Ht 165.1 cm (65\")   Wt 83.5 kg (184 lb)   SpO2 100%   BMI 30.62 kg/m²       Physical Exam  HENT:      Head: Normocephalic.   Cardiovascular:      Rate and Rhythm: Normal rate.   Pulmonary:      " "Effort: Pulmonary effort is normal.   Abdominal:      General: Bowel sounds are normal.   Musculoskeletal:         General: Normal range of motion.   Neurological:      General: No focal deficit present.      Mental Status: She is alert.         Results       Result Review :    The following data was reviewed by: NICK Flower on 10/11/2024:    Lab Results - Last 18 Months   Lab Units 07/11/24  0830   WBC 10*3/mm3 8.44   HEMOGLOBIN g/dL 10.1*   MCV fL 79.1   PLATELETS 10*3/mm3 385         Lab Results - Last 18 Months   Lab Units 07/11/24  0830   BUN mg/dL 7   CREATININE mg/dL 0.67   SODIUM mmol/L 137   POTASSIUM mmol/L 3.8   CHLORIDE mmol/L 103   CO2 mmol/L 23.0   GLUCOSE mg/dL 78      No results for input(s): \"PROTIME\", \"INR\", \"PTT\" in the last 28075 hours.  Lab Results - Last 18 Months   Lab Units 07/11/24  0830   AST (SGOT) U/L 20   ALT (SGPT) U/L 11   ALK PHOS U/L 75   BILIRUBIN mg/dL 0.2   TOTAL PROTEIN g/dL 7.3   ALBUMIN g/dL 4.2      Lab Results - Last 18 Months   Lab Units 07/17/24  0822   IRON mcg/dL 20*   TRANSFERRIN mg/dL 283   TIBC mcg/dL 422   FERRITIN ng/mL 7.01*     No results for input(s): \"HAV\", \"HEPAIGM\", \"HEPBIGM\", \"HEPBCAB\", \"HBEAG\", \"HEPCAB\" in the last 04393 hours.    No Images in the past 120 days found..         Assessment and Plan              Diagnoses and all orders for this visit:    1. Iron deficiency anemia, unspecified iron deficiency anemia type (Primary)  -     Hemoglobin & Hematocrit, Blood; Future          * Surgery not found *    Follow Up     Follow Up   Return if symptoms worsen or fail to improve.    Ordered hemoglobin and hematocrit to determine current level of anemia.  Patient wants to hold off pill cam, because she is unable to come to the office at 8 AM.  We discussed the option of CT enterography as another option to determine if there is a bleed in the small intestines.  At this time patient is not interested in proceeding with a CT enterography.  Patient " explains if she changes her mind she will contact the office to schedule it at that time.    Patient was given instructions and counseling regarding her condition or for health maintenance advice. Please see specific information pulled into the AVS if appropriate.

## 2024-11-15 ENCOUNTER — TELEPHONE (OUTPATIENT)
Age: 39
End: 2024-11-15
Payer: COMMERCIAL

## 2025-01-30 ENCOUNTER — TELEPHONE (OUTPATIENT)
Dept: FAMILY MEDICINE CLINIC | Age: 40
End: 2025-01-30
Payer: COMMERCIAL

## 2025-01-30 NOTE — TELEPHONE ENCOUNTER
HUB TO READ, called to get pt set up with a new provider due to Juan M leaving, left voicemail for call back 01/30/2025 AB

## (undated) DEVICE — BLCK/BITE BLOX WO/DENTL/RIM W/STRAP 54F

## (undated) DEVICE — SOLIDIFIER LIQLOC PLS 1500CC BT

## (undated) DEVICE — SNAR E/S POLYP SNAREMASTER OVL/10MM 2.8X2300MM YEL

## (undated) DEVICE — THE SINGLE USE ETRAP – POLYP TRAP IS USED FOR SUCTION RETRIEVAL OF ENDOSCOPICALLY REMOVED POLYPS.: Brand: ETRAP

## (undated) DEVICE — SINGLE-USE BIOPSY FORCEPS: Brand: RADIAL JAW 4

## (undated) DEVICE — Device: Brand: DEFENDO AIR/WATER/SUCTION AND BIOPSY VALVE

## (undated) DEVICE — LINER SURG CANSTR SXN S/RIGD 1500CC

## (undated) DEVICE — Device

## (undated) DEVICE — SOL IRRG H2O PL/BG 1000ML STRL

## (undated) DEVICE — CONN JET HYDRA H20 AUXILIARY DISP

## (undated) DEVICE — DISPOSABLE DISTAL ATTACHMENT: Brand: DISPOSABLE DISTAL ATTACHMENT